# Patient Record
Sex: MALE | Race: WHITE | NOT HISPANIC OR LATINO | URBAN - METROPOLITAN AREA
[De-identification: names, ages, dates, MRNs, and addresses within clinical notes are randomized per-mention and may not be internally consistent; named-entity substitution may affect disease eponyms.]

---

## 2024-01-16 ENCOUNTER — OFFICE VISIT (OUTPATIENT)
Dept: URGENT CARE | Facility: CLINIC | Age: 24
End: 2024-01-16

## 2024-01-16 VITALS
WEIGHT: 280 LBS | HEIGHT: 73 IN | BODY MASS INDEX: 37.11 KG/M2 | RESPIRATION RATE: 18 BRPM | TEMPERATURE: 97.2 F | OXYGEN SATURATION: 98 % | DIASTOLIC BLOOD PRESSURE: 74 MMHG | HEART RATE: 69 BPM | SYSTOLIC BLOOD PRESSURE: 124 MMHG

## 2024-01-16 DIAGNOSIS — L03.115 CELLULITIS OF RIGHT LOWER EXTREMITY: ICD-10-CM

## 2024-01-16 PROCEDURE — 3074F SYST BP LT 130 MM HG: CPT

## 2024-01-16 PROCEDURE — 3078F DIAST BP <80 MM HG: CPT

## 2024-01-16 PROCEDURE — 99203 OFFICE O/P NEW LOW 30 MIN: CPT

## 2024-01-16 RX ORDER — CEPHALEXIN 500 MG/1
500 CAPSULE ORAL 4 TIMES DAILY
Qty: 20 CAPSULE | Refills: 0 | Status: SHIPPED | OUTPATIENT
Start: 2024-01-16 | End: 2024-01-21

## 2024-01-17 NOTE — PROGRESS NOTES
"Chief Complaint   Patient presents with    Other     X8 days: Gave tattoo to self and believe it could be infected. About the size of a palm with redness.          Subjective:   HISTORY OF PRESENT ILLNESS: Stan Glover is a 23 y.o. male who presents for redness and pain to his fresh tatoo.  He is a new  and this is his first tatto from himslef, he used all steralized equipment and took proper precautions but is concerned about 1 area that appears more red than the others   Patient denies fevers, drainage, body aches or chills    Medications, Allergies, current problem list, Social and Family history reviewed today in Epic.     Objective:     /74   Pulse 69   Temp 36.2 °C (97.2 °F)   Resp 18   Ht 1.854 m (6' 1\")   Wt (!) 127 kg (280 lb)   SpO2 98%     Physical Exam  Vitals reviewed.   Constitutional:       Appearance: Normal appearance.   HENT:      Mouth/Throat:      Mouth: Mucous membranes are moist.   Cardiovascular:      Rate and Rhythm: Normal rate.   Pulmonary:      Effort: Pulmonary effort is normal.   Skin:     General: Skin is warm and dry.             Comments: Flower located on his right anterior thigh, there is some localized redness inside one flower petal but no significant surrounding redness. No fluctuance or induration to indicate an abscess   Neurological:      Mental Status: He is alert and oriented to person, place, and time.   Psychiatric:         Mood and Affect: Mood normal.          Assessment/Plan:     Diagnosis and associated orders    I personally reviewed prior external notes and test results pertinent to today's visit.     1. Cellulitis of right lower extremity  cephALEXin (KEFLEX) 500 MG Cap            IMPRESSION:  Pt has stable vital signs and no red flag symptoms identified. Exam reveals mild redness to an 8 day old tattoo.   Informed pt that symptoms are consistent with mild skin infection.  Advised to trial a few days of topical antibiotic ointment.  " If his infection becomes worse or does not resolve with topical he can start the oral antibx.      Differential diagnosis discussed. Pt was Educated on red flag symptoms. Pt has been Instructed to return to Urgent Care or nearest Emergency Department if symptoms fail to improve, for any change in condition, further concerns, or new concerning symptoms. Patient states understanding of the plan of care and discharge instructions.  They are discharged in stable condition.         Please note that this dictation was created using voice recognition software. I have made a reasonable attempt to correct obvious errors, but I expect that there are errors of grammar and possibly content that I did not discover before finalizing the note.    This note was electronically signed by FIORELLA Mae

## 2025-04-07 ENCOUNTER — APPOINTMENT (OUTPATIENT)
Dept: LAB | Facility: MEDICAL CENTER | Age: 25
End: 2025-04-07